# Patient Record
Sex: MALE | Race: OTHER | NOT HISPANIC OR LATINO | URBAN - METROPOLITAN AREA
[De-identification: names, ages, dates, MRNs, and addresses within clinical notes are randomized per-mention and may not be internally consistent; named-entity substitution may affect disease eponyms.]

---

## 2018-03-07 PROBLEM — Z00.00 ENCOUNTER FOR PREVENTIVE HEALTH EXAMINATION: Status: ACTIVE | Noted: 2018-03-07

## 2018-03-09 ENCOUNTER — OUTPATIENT (OUTPATIENT)
Dept: OUTPATIENT SERVICES | Facility: HOSPITAL | Age: 73
LOS: 1 days | End: 2018-03-09
Payer: MEDICARE

## 2018-03-09 ENCOUNTER — APPOINTMENT (OUTPATIENT)
Dept: ULTRASOUND IMAGING | Facility: CLINIC | Age: 73
End: 2018-03-09
Payer: MEDICARE

## 2018-03-09 DIAGNOSIS — Z00.8 ENCOUNTER FOR OTHER GENERAL EXAMINATION: ICD-10-CM

## 2018-03-09 PROCEDURE — 76882 US LMTD JT/FCL EVL NVASC XTR: CPT

## 2018-03-09 PROCEDURE — 76882 US LMTD JT/FCL EVL NVASC XTR: CPT | Mod: 26,RT

## 2019-10-07 ENCOUNTER — APPOINTMENT (OUTPATIENT)
Dept: PULMONOLOGY | Facility: CLINIC | Age: 74
End: 2019-10-07
Payer: MEDICARE

## 2019-10-07 VITALS
HEIGHT: 69 IN | HEART RATE: 68 BPM | SYSTOLIC BLOOD PRESSURE: 110 MMHG | BODY MASS INDEX: 20.14 KG/M2 | WEIGHT: 136 LBS | TEMPERATURE: 98.2 F | RESPIRATION RATE: 12 BRPM | DIASTOLIC BLOOD PRESSURE: 80 MMHG | OXYGEN SATURATION: 98 %

## 2019-10-07 DIAGNOSIS — F51.04 PSYCHOPHYSIOLOGIC INSOMNIA: ICD-10-CM

## 2019-10-07 PROCEDURE — 99204 OFFICE O/P NEW MOD 45 MIN: CPT

## 2019-10-07 NOTE — HISTORY OF PRESENT ILLNESS
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 He does not have any problem initiating sleep but mostly maintaining it. He also has noticed his insomnia got worst after group home. He also tried taking melatonin and CBD with minimall improvement in his sleep.\par \par \par Sleep Routine:\par \par He goes to bed at 11PM, sleep latency is about 15 min, he wakes up 3-5x/night, WASO varies and then he is up at 8AM. He does not nap . His ESS is 0/24.\par \par \par He denies cataplexy, RLS, parasomnia, or any other sleep behavioral issues\par \par

## 2019-10-07 NOTE — REVIEW OF SYSTEMS
[EDS: ESS=____] : daytime somnolence: ESS=[unfilled] [Nocturia] : nocturia [Negative] : Psychiatric [Fatigue] : no fatigue [Recent Wt Gain (___ Lbs)] : no recent weight gain [FreeTextEntry9] : MARÍA ELENA [Recent Wt Loss (___ Lbs)] : no recent weight loss [FreeTextEntry5] : BPH

## 2019-10-07 NOTE — PHYSICAL EXAM
[General Appearance - Well Developed] : well developed [Normal Appearance] : normal appearance [Well Groomed] : well groomed [General Appearance - Well Nourished] : well nourished [No Deformities] : no deformities [Normal Conjunctiva] : the conjunctiva exhibited no abnormalities [General Appearance - In No Acute Distress] : no acute distress [Eyelids - No Xanthelasma] : the eyelids demonstrated no xanthelasmas [Normal Oropharynx] : normal oropharynx [I] : I [Neck Appearance] : the appearance of the neck was normal [Jugular Venous Distention Increased] : there was no jugular-venous distention [Neck Cervical Mass (___cm)] : no neck mass was observed [Thyroid Diffuse Enlargement] : the thyroid was not enlarged [Thyroid Nodule] : there were no palpable thyroid nodules [Heart Rate And Rhythm] : heart rate was normal and rhythm regular [Heart Sounds] : normal S1 and S2 [Heart Sounds Gallop] : no gallops [Heart Sounds Pericardial Friction Rub] : no pericardial rub [Murmurs] : no murmurs [Auscultation Breath Sounds / Voice Sounds] : lungs were clear to auscultation bilaterally [Abnormal Walk] : normal gait [Musculoskeletal - Swelling] : no joint swelling seen [Nail Clubbing] : no clubbing of the fingernails [Motor Tone] : muscle strength and tone were normal [Cyanosis, Localized] : no localized cyanosis [Petechial Hemorrhages (___cm)] : no petechial hemorrhages [Skin Color & Pigmentation] : normal skin color and pigmentation [Skin Turgor] : normal skin turgor [] : no rash [Deep Tendon Reflexes (DTR)] : deep tendon reflexes were 2+ and symmetric [Sensation] : the sensory exam was normal to light touch and pinprick [Oriented To Time, Place, And Person] : oriented to person, place, and time [No Focal Deficits] : no focal deficits [Affect] : the affect was normal [Impaired Insight] : insight and judgment were intact

## 2019-10-07 NOTE — END OF VISIT
[FreeTextEntry3] : Case reviewed and patient examined with PA, plans as noted. Long term insomnia (decades) with excessive time in bed, no excessive daytime somnolence.  Discussed sleep hygiene rules, put on sleep restriction protocol, f/u in one month, consider overnight polysomnography if not better

## 2020-05-01 ENCOUNTER — APPOINTMENT (OUTPATIENT)
Dept: UROLOGY | Facility: CLINIC | Age: 75
End: 2020-05-01
Payer: MEDICARE

## 2020-05-01 VITALS
TEMPERATURE: 96.7 F | HEART RATE: 87 BPM | WEIGHT: 134 LBS | HEIGHT: 69 IN | OXYGEN SATURATION: 97 % | BODY MASS INDEX: 19.85 KG/M2 | DIASTOLIC BLOOD PRESSURE: 93 MMHG | SYSTOLIC BLOOD PRESSURE: 157 MMHG

## 2020-05-01 DIAGNOSIS — E78.5 HYPERLIPIDEMIA, UNSPECIFIED: ICD-10-CM

## 2020-05-01 DIAGNOSIS — Z78.9 OTHER SPECIFIED HEALTH STATUS: ICD-10-CM

## 2020-05-01 DIAGNOSIS — J30.2 OTHER SEASONAL ALLERGIC RHINITIS: ICD-10-CM

## 2020-05-01 PROCEDURE — 99204 OFFICE O/P NEW MOD 45 MIN: CPT

## 2020-05-01 PROCEDURE — 76857 US EXAM PELVIC LIMITED: CPT

## 2020-05-01 NOTE — PHYSICAL EXAM
[General Appearance - Well Developed] : well developed [Normal Appearance] : normal appearance [General Appearance - Well Nourished] : well nourished [General Appearance - In No Acute Distress] : no acute distress [Well Groomed] : well groomed [Abdomen Soft] : soft [Abdomen Mass (___ Cm)] : no abdominal mass palpated [Costovertebral Angle Tenderness] : no ~M costovertebral angle tenderness [Abdomen Tenderness] : non-tender [Penis Abnormality] : normal circumcised penis [Urethral Meatus] : meatus normal [Scrotum] : the scrotum was normal [Urinary Bladder Findings] : the bladder was normal on palpation [Epididymis] : the epididymides were normal [Testes Tenderness] : no tenderness of the testes [Testes Mass (___cm)] : there were no testicular masses [No Prostate Nodules] : no prostate nodules [Prostate Tenderness] : the prostate was not tender [Skin Color & Pigmentation] : normal skin color and pigmentation [Edema] : no peripheral edema [Heart Rate And Rhythm] : Heart rate and rhythm were normal [] : no respiratory distress [Respiration, Rhythm And Depth] : normal respiratory rhythm and effort [Exaggerated Use Of Accessory Muscles For Inspiration] : no accessory muscle use [Oriented To Time, Place, And Person] : oriented to person, place, and time [Mood] : the mood was normal [Affect] : the affect was normal [Not Anxious] : not anxious [Normal Station and Gait] : the gait and station were normal for the patient's age [No Focal Deficits] : no focal deficits [No Palpable Adenopathy] : no palpable adenopathy [FreeTextEntry1] : small reducible right inguinal hernia

## 2020-05-01 NOTE — LETTER BODY
[Dear  ___] : Dear  [unfilled], [Please see my note below.] : Please see my note below. [Consult Closing:] : Thank you very much for allowing me to participate in the care of this patient.  If you have any questions, please do not hesitate to contact me. [Sincerely,] : Sincerely, [DrEleuterio  ___] : Dr. POWELL [FreeTextEntry3] : Cali Bang MD, FACS\par

## 2020-05-01 NOTE — HISTORY OF PRESENT ILLNESS
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  He presents with gross painless hematuria with clots experienced two days ago lasting 24hrs. He denies any direct trauma (although he is very physically active) or a history of urolithiasis.\par \par From his general urologic history he reports moderate LUTS (obstructive and irritative) with nocturia x 1-2. His urinary control is good.  He underwent a prostate embolization in ? 2017 with some subjective improvement.  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

## 2020-05-01 NOTE — ASSESSMENT
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 We agreed that he would proceed with a CT of the abdomen and pelvis(-/+) and then return for an in office cystoscopy. No further intervention is warranted for his stable urinary symptoms or the asymptomatic right inguinal hernia.  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

## 2020-05-04 LAB
BACTERIA UR CULT: NORMAL
URINE CYTOLOGY: NORMAL

## 2020-06-19 ENCOUNTER — OUTPATIENT (OUTPATIENT)
Dept: OUTPATIENT SERVICES | Facility: HOSPITAL | Age: 75
LOS: 1 days | End: 2020-06-19
Payer: MEDICARE

## 2020-06-19 PROCEDURE — 74178 CT ABD&PLV WO CNTR FLWD CNTR: CPT | Mod: 26

## 2020-06-25 ENCOUNTER — APPOINTMENT (OUTPATIENT)
Dept: UROLOGY | Facility: CLINIC | Age: 75
End: 2020-06-25
Payer: MEDICARE

## 2020-06-25 DIAGNOSIS — N40.0 BENIGN PROSTATIC HYPERPLASIA WITHOUT LOWER URINARY TRACT SYMPMS: ICD-10-CM

## 2020-06-25 DIAGNOSIS — N52.01 ERECTILE DYSFUNCTION DUE TO ARTERIAL INSUFFICIENCY: ICD-10-CM

## 2020-06-25 DIAGNOSIS — R97.20 ELEVATED PROSTATE, SPECIFIC ANTIGEN [PSA]: ICD-10-CM

## 2020-06-25 DIAGNOSIS — R39.9 UNSPECIFIED SYMPTOMS AND SIGNS INVOLVING THE GENITOURINARY SYSTEM: ICD-10-CM

## 2020-06-25 DIAGNOSIS — N35.912 UNSPECIFIED BULBOUS URETHRAL STRICTURE, MALE: ICD-10-CM

## 2020-06-25 DIAGNOSIS — K40.90 UNILATERAL INGUINAL HERNIA, W/OUT OBSTRUCTION OR GANGRENE, NOT SPECIFIED AS RECURRENT: ICD-10-CM

## 2020-06-25 DIAGNOSIS — Z87.448 PERSONAL HISTORY OF OTHER DISEASES OF URINARY SYSTEM: ICD-10-CM

## 2020-06-25 LAB
BILIRUB UR QL STRIP: NORMAL
CLARITY UR: CLEAR
COLLECTION METHOD: NORMAL
GLUCOSE UR-MCNC: NORMAL
HCG UR QL: 0.2 EU/DL
HGB UR QL STRIP.AUTO: NORMAL
KETONES UR-MCNC: NORMAL
LEUKOCYTE ESTERASE UR QL STRIP: NORMAL
NITRITE UR QL STRIP: NORMAL
PH UR STRIP: 6.5
PROT UR STRIP-MCNC: NORMAL
SP GR UR STRIP: 1.02

## 2020-06-25 PROCEDURE — 76857 US EXAM PELVIC LIMITED: CPT

## 2020-06-25 PROCEDURE — 81003 URINALYSIS AUTO W/O SCOPE: CPT | Mod: QW

## 2020-06-25 PROCEDURE — 99214 OFFICE O/P EST MOD 30 MIN: CPT | Mod: 25

## 2020-06-25 PROCEDURE — 52281 CYSTOSCOPY AND TREATMENT: CPT

## 2020-06-25 NOTE — ASSESSMENT
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 No further intervention is warranted as he is voiding satisfactorily with normal post-void residuals.  The evaluation for the episodic gross hematuria was similarly normal.  \par Providing that  there are no new problems, I look forward to seeing Dr. Warren in 1 year.\par (He has decided to follow the AUA guidelines and stop checking PSAs as he is 75 years old).\par

## 2020-06-25 NOTE — LETTER BODY
[Dear  ___] : Dear  [unfilled], [Please see my note below.] : Please see my note below. [Sincerely,] : Sincerely, [Consult Closing:] : Thank you very much for allowing me to participate in the care of this patient.  If you have any questions, please do not hesitate to contact me. [DrEleuterio  ___] : Dr. POWELL [FreeTextEntry3] : Cali Bang MD, FACS\par

## 2020-06-25 NOTE — HISTORY OF PRESENT ILLNESS
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 \par \par From his general urologic history he reports moderate LUTS (obstructive and irritative) with nocturia x 1-2. His urinary control is good.  He underwent a prostate embolization in ? 2017 with some subjective improvement.  Prior to that, he had tried alpha blockers, which he was not able to tolerate because of hypotension.\par \par Dr. Warren reports adequate erections.\par \par \par PSAs:  3/20/17--10.2 (24%); 12/27/16--15.4 (29%); 12/14/16--8.9; 11/21/08--3.7 (16%); 3/27/08--8.9 (24%); 3/14/08--5.89; 3/5/08--8.55\par 4K score:  3/20/17--11%; 1/24/17--60%\par \par Prostate MRI:  2/13/17--BPH (135cc gland)\par \par Prostate bx:  4/9/08--BPH\par \par CT abd/pelvis:  6/19/20--Enlarged prostate >100cc. Lumbar spondylosis and stenosis.

## 2020-06-25 NOTE — PHYSICAL EXAM
[General Appearance - Well Developed] : well developed [General Appearance - Well Nourished] : well nourished [Normal Appearance] : normal appearance [Well Groomed] : well groomed [General Appearance - In No Acute Distress] : no acute distress [Abdomen Mass (___ Cm)] : no abdominal mass palpated [Abdomen Soft] : soft [Abdomen Tenderness] : non-tender [Costovertebral Angle Tenderness] : no ~M costovertebral angle tenderness [Penis Abnormality] : normal circumcised penis [Urethral Meatus] : meatus normal [Urinary Bladder Findings] : the bladder was normal on palpation [Scrotum] : the scrotum was normal [Epididymis] : the epididymides were normal [Testes Mass (___cm)] : there were no testicular masses [Testes Tenderness] : no tenderness of the testes [Skin Color & Pigmentation] : normal skin color and pigmentation [] : no respiratory distress [Oriented To Time, Place, And Person] : oriented to person, place, and time [Exaggerated Use Of Accessory Muscles For Inspiration] : no accessory muscle use [Respiration, Rhythm And Depth] : normal respiratory rhythm and effort [Affect] : the affect was normal [Not Anxious] : not anxious [Mood] : the mood was normal [Normal Station and Gait] : the gait and station were normal for the patient's age [No Focal Deficits] : no focal deficits [No Palpable Adenopathy] : no palpable adenopathy [FreeTextEntry1] : small reducible right inguinal hernia

## 2020-06-29 LAB — BACTERIA UR CULT: NORMAL

## 2021-09-01 ENCOUNTER — APPOINTMENT (OUTPATIENT)
Dept: UROLOGY | Facility: CLINIC | Age: 76
End: 2021-09-01

## 2022-04-12 ENCOUNTER — APPOINTMENT (OUTPATIENT)
Dept: ULTRASOUND IMAGING | Facility: CLINIC | Age: 77
End: 2022-04-12

## 2022-06-07 ENCOUNTER — APPOINTMENT (OUTPATIENT)
Dept: ORTHOPEDIC SURGERY | Facility: CLINIC | Age: 77
End: 2022-06-07
Payer: SELF-PAY

## 2022-06-07 ENCOUNTER — OUTPATIENT (OUTPATIENT)
Dept: OUTPATIENT SERVICES | Facility: HOSPITAL | Age: 77
LOS: 1 days | End: 2022-06-07
Payer: MEDICARE

## 2022-06-07 ENCOUNTER — RESULT REVIEW (OUTPATIENT)
Age: 77
End: 2022-06-07

## 2022-06-07 VITALS
TEMPERATURE: 98 F | SYSTOLIC BLOOD PRESSURE: 136 MMHG | OXYGEN SATURATION: 97 % | DIASTOLIC BLOOD PRESSURE: 86 MMHG | WEIGHT: 135 LBS | HEART RATE: 85 BPM | BODY MASS INDEX: 19.99 KG/M2 | HEIGHT: 69 IN

## 2022-06-07 PROCEDURE — 73564 X-RAY EXAM KNEE 4 OR MORE: CPT | Mod: 26,50

## 2022-06-07 PROCEDURE — 73564 X-RAY EXAM KNEE 4 OR MORE: CPT

## 2022-06-07 PROCEDURE — 0232T NJX PLATELET PLASMA: CPT | Mod: RT

## 2022-06-08 NOTE — PROCEDURE
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 He has a long standing h.o DJD and has managed well over the years with activity modification. He has noted increased stiffness and inability to run. He has ongoing medial knee pain and stiffness\par \par Radiographs today show moderate medial compartment DJD in his right knee \par \par The patient presents today for a PRP injection of the right knee. He verbalized informed consent for the procedure.\par \par Then, using strict sterile technique, 15ccs of blood was aspirated from his right arm. The blood was centrifuged on the [employer] machine  using sterile technique.\par \par Under strict sterile technique, 3.5ccs of centifuged precipitate was then injected intraarticularly into the right knee\par \par The patient tolerated the procedure well. He was advised to avoid NSAIDS and avoid icing for the next 72 hours. He will follow up in 2 weeks for the second injection. He will call if any questions or problems should arise.

## 2022-06-10 RX ORDER — VALACYCLOVIR 1 G/1
1 TABLET, FILM COATED ORAL
Qty: 60 | Refills: 0 | Status: ACTIVE | COMMUNITY
Start: 2022-03-07

## 2022-06-10 RX ORDER — ATORVASTATIN CALCIUM 10 MG/1
10 TABLET, FILM COATED ORAL
Qty: 90 | Refills: 0 | Status: ACTIVE | COMMUNITY
Start: 2022-06-03

## 2022-06-10 RX ORDER — LOTEPREDNOL ETABONATE 5 MG/ML
0.5 SUSPENSION/ DROPS OPHTHALMIC
Qty: 5 | Refills: 0 | Status: ACTIVE | COMMUNITY
Start: 2021-12-23

## 2022-06-10 RX ORDER — OMEPRAZOLE 40 MG/1
40 CAPSULE, DELAYED RELEASE ORAL
Qty: 30 | Refills: 0 | Status: ACTIVE | COMMUNITY
Start: 2022-05-26

## 2022-06-10 RX ORDER — TADALAFIL 2.5 MG/1
2.5 TABLET, FILM COATED ORAL
Qty: 60 | Refills: 0 | Status: ACTIVE | COMMUNITY
Start: 2022-04-08

## 2022-06-10 RX ORDER — ATORVASTATIN CALCIUM 80 MG/1
TABLET, FILM COATED ORAL
Refills: 0 | Status: DISCONTINUED | COMMUNITY
End: 2022-06-10

## 2022-06-10 RX ORDER — TRIAMCINOLONE ACETONIDE 1 MG/G
0.1 CREAM TOPICAL
Qty: 80 | Refills: 0 | Status: ACTIVE | COMMUNITY
Start: 2022-01-04

## 2022-06-10 RX ORDER — AZITHROMYCIN 250 MG/1
250 TABLET, FILM COATED ORAL
Qty: 6 | Refills: 0 | Status: COMPLETED | COMMUNITY
Start: 2022-03-08

## 2022-06-10 RX ORDER — GENTAMICIN SULFATE 1 MG/G
0.1 OINTMENT TOPICAL
Qty: 30 | Refills: 0 | Status: COMPLETED | COMMUNITY
Start: 2022-01-04

## 2022-06-10 RX ORDER — TADALAFIL 5 MG/1
5 TABLET ORAL
Qty: 30 | Refills: 0 | Status: ACTIVE | COMMUNITY
Start: 2022-04-01

## 2022-06-10 RX ORDER — LEVOFLOXACIN 500 MG/1
500 TABLET, FILM COATED ORAL
Qty: 5 | Refills: 0 | Status: COMPLETED | COMMUNITY
Start: 2022-01-10

## 2022-06-10 RX ORDER — AMPICILLIN 500 MG/1
500 CAPSULE ORAL
Qty: 40 | Refills: 0 | Status: COMPLETED | COMMUNITY
Start: 2021-12-23

## 2022-06-21 ENCOUNTER — APPOINTMENT (OUTPATIENT)
Dept: ORTHOPEDIC SURGERY | Facility: CLINIC | Age: 77
End: 2022-06-21
Payer: SELF-PAY

## 2022-06-21 PROCEDURE — 0232T NJX PLATELET PLASMA: CPT | Mod: RT

## 2022-06-22 RX ORDER — VALACYCLOVIR 500 MG/1
500 TABLET, FILM COATED ORAL
Qty: 90 | Refills: 0 | Status: ACTIVE | COMMUNITY
Start: 2022-06-08

## 2022-06-22 NOTE — PROCEDURE
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 He has a long standing h.o DJD and has managed well over the years with activity modification. He has noted increased stiffness and inability to run. He has ongoing medial knee pain and stiffness\par \par Radiographs today show moderate medial compartment DJD in his right knee \par \par The patient presents today for a PRP injection of the right knee. He verbalized informed consent for the procedure.\par \par Then, using strict sterile technique, 15ccs of blood was aspirated from his right arm. The blood was centrifuged on the [employer] machine  using sterile technique.\par \par Under strict sterile technique, 3.5ccs of centifuged precipitate was then injected intraarticularly into the right knee\par \par The patient tolerated the procedure well. He was advised to avoid NSAIDS and avoid icing for the next 72 hours. He will follow up in 2 weeks for the second injection. He will call if any questions or problems should arise.

## 2022-07-19 ENCOUNTER — APPOINTMENT (OUTPATIENT)
Dept: ORTHOPEDIC SURGERY | Facility: CLINIC | Age: 77
End: 2022-07-19

## 2022-07-19 VITALS
HEART RATE: 62 BPM | OXYGEN SATURATION: 98 % | BODY MASS INDEX: 19.99 KG/M2 | WEIGHT: 135 LBS | SYSTOLIC BLOOD PRESSURE: 122 MMHG | DIASTOLIC BLOOD PRESSURE: 81 MMHG | HEIGHT: 69 IN

## 2022-07-19 DIAGNOSIS — M17.11 UNILATERAL PRIMARY OSTEOARTHRITIS, RIGHT KNEE: ICD-10-CM

## 2022-07-19 PROCEDURE — 0232T NJX PLATELET PLASMA: CPT | Mod: RT

## 2022-07-20 PROBLEM — M17.11 PRIMARY OSTEOARTHRITIS OF RIGHT KNEE: Status: ACTIVE | Noted: 2022-06-08

## 2022-07-20 NOTE — PROCEDURE
[de-identified] : cc: right knee pain and stiffness\par DX: right knee DJD \par \par The patient presents today for a PRP injection of the right knee. He verbalized informed consent for the procedure.\par \par Then, using strict sterile technique, 15ccs of blood was aspirated from his right arm. The [employer] ACP machine was used to process the blood using sterile technique.\par \par Under strict sterile technique, 3.5ccs of centrifuged precipitate was then injected intraarticularly into the right knee\par \par The patient tolerated the procedure well. He was advised to avoid NSAIDS and avoid icing for the next 72 hours. He will follow up in 2 weeks for the second injection. He will call if any questions or problems should arise.\par \par \par ref [id number]\par Lot [id number]\par 2024-03-31\par

## 2025-02-20 ENCOUNTER — APPOINTMENT (OUTPATIENT)
Dept: ORTHOPEDIC SURGERY | Facility: CLINIC | Age: 80
End: 2025-02-20
Payer: MEDICARE

## 2025-02-20 ENCOUNTER — NON-APPOINTMENT (OUTPATIENT)
Age: 80
End: 2025-02-20

## 2025-02-20 VITALS — BODY MASS INDEX: 20.73 KG/M2 | WEIGHT: 140 LBS | HEIGHT: 69 IN

## 2025-02-20 DIAGNOSIS — M51.26 OTHER INTERVERTEBRAL DISC DISPLACEMENT, LUMBAR REGION: ICD-10-CM

## 2025-02-20 DIAGNOSIS — Z78.9 OTHER SPECIFIED HEALTH STATUS: ICD-10-CM

## 2025-02-20 PROCEDURE — 99202 OFFICE O/P NEW SF 15 MIN: CPT

## 2025-02-21 PROBLEM — Z78.9 EXERCISES OCCASIONALLY: Status: ACTIVE | Noted: 2025-02-20

## 2025-02-21 PROBLEM — M51.26 PROLAPSED LUMBAR DISC: Status: ACTIVE | Noted: 2025-02-21
